# Patient Record
Sex: MALE | Race: WHITE | NOT HISPANIC OR LATINO | Employment: FULL TIME | URBAN - METROPOLITAN AREA
[De-identification: names, ages, dates, MRNs, and addresses within clinical notes are randomized per-mention and may not be internally consistent; named-entity substitution may affect disease eponyms.]

---

## 2023-07-08 LAB
ALANINE AMINOTRANSFERASE (SGPT) (U/L) IN SER/PLAS: 19 U/L (ref 10–52)
ALBUMIN (G/DL) IN SER/PLAS: 4.4 G/DL (ref 3.4–5)
ALKALINE PHOSPHATASE (U/L) IN SER/PLAS: 68 U/L (ref 33–136)
ANION GAP IN SER/PLAS: 12 MMOL/L (ref 10–20)
ASPARTATE AMINOTRANSFERASE (SGOT) (U/L) IN SER/PLAS: 17 U/L (ref 9–39)
BILIRUBIN TOTAL (MG/DL) IN SER/PLAS: 1.1 MG/DL (ref 0–1.2)
CALCIUM (MG/DL) IN SER/PLAS: 9.1 MG/DL (ref 8.6–10.3)
CARBON DIOXIDE, TOTAL (MMOL/L) IN SER/PLAS: 25 MMOL/L (ref 21–32)
CHLORIDE (MMOL/L) IN SER/PLAS: 106 MMOL/L (ref 98–107)
CHOLESTEROL (MG/DL) IN SER/PLAS: 148 MG/DL (ref 0–199)
CHOLESTEROL IN HDL (MG/DL) IN SER/PLAS: 44.7 MG/DL
CHOLESTEROL/HDL RATIO: 3.3
CREATININE (MG/DL) IN SER/PLAS: 0.96 MG/DL (ref 0.5–1.3)
ERYTHROCYTE DISTRIBUTION WIDTH (RATIO) BY AUTOMATED COUNT: 13 % (ref 11.5–14.5)
ERYTHROCYTE MEAN CORPUSCULAR HEMOGLOBIN CONCENTRATION (G/DL) BY AUTOMATED: 32.8 G/DL (ref 32–36)
ERYTHROCYTE MEAN CORPUSCULAR VOLUME (FL) BY AUTOMATED COUNT: 89 FL (ref 80–100)
ERYTHROCYTES (10*6/UL) IN BLOOD BY AUTOMATED COUNT: 5.47 X10E12/L (ref 4.5–5.9)
GFR MALE: 90 ML/MIN/1.73M2
GLUCOSE (MG/DL) IN SER/PLAS: 107 MG/DL (ref 74–99)
HEMATOCRIT (%) IN BLOOD BY AUTOMATED COUNT: 48.8 % (ref 41–52)
HEMOGLOBIN (G/DL) IN BLOOD: 16 G/DL (ref 13.5–17.5)
LDL: 88 MG/DL (ref 0–99)
LEUKOCYTES (10*3/UL) IN BLOOD BY AUTOMATED COUNT: 8.4 X10E9/L (ref 4.4–11.3)
NRBC (PER 100 WBCS) BY AUTOMATED COUNT: 0 /100 WBC (ref 0–0)
PLATELETS (10*3/UL) IN BLOOD AUTOMATED COUNT: 243 X10E9/L (ref 150–450)
POTASSIUM (MMOL/L) IN SER/PLAS: 4.4 MMOL/L (ref 3.5–5.3)
PROSTATE SPECIFIC ANTIGEN,SCREEN: 2.36 NG/ML (ref 0–4)
PROTEIN TOTAL: 7 G/DL (ref 6.4–8.2)
SODIUM (MMOL/L) IN SER/PLAS: 139 MMOL/L (ref 136–145)
TRIGLYCERIDE (MG/DL) IN SER/PLAS: 79 MG/DL (ref 0–149)
UREA NITROGEN (MG/DL) IN SER/PLAS: 21 MG/DL (ref 6–23)
VLDL: 16 MG/DL (ref 0–40)

## 2023-07-09 LAB
ESTIMATED AVERAGE GLUCOSE FOR HBA1C: 128 MG/DL
HEMOGLOBIN A1C/HEMOGLOBIN TOTAL IN BLOOD: 6.1 %

## 2023-10-19 PROBLEM — J45.909 ASTHMA (HHS-HCC): Status: ACTIVE | Noted: 2023-10-19

## 2023-10-19 PROBLEM — K64.4 EXTERNAL HEMORRHOIDS: Status: ACTIVE | Noted: 2023-10-19

## 2023-10-19 PROBLEM — G45.9 TIA (TRANSIENT ISCHEMIC ATTACK): Status: ACTIVE | Noted: 2023-10-19

## 2023-10-19 PROBLEM — I48.0 PAROXYSMAL ATRIAL FIBRILLATION (MULTI): Status: ACTIVE | Noted: 2023-10-19

## 2023-10-19 PROBLEM — R06.00 DYSPNEA: Status: ACTIVE | Noted: 2023-10-19

## 2023-10-19 PROBLEM — G47.00 INSOMNIA: Status: ACTIVE | Noted: 2023-10-19

## 2023-10-19 PROBLEM — M25.522 LEFT ELBOW PAIN: Status: ACTIVE | Noted: 2023-10-19

## 2023-10-19 PROBLEM — R73.9 HYPERGLYCEMIA: Status: ACTIVE | Noted: 2023-10-19

## 2023-10-19 PROBLEM — I10 HYPERTENSION: Status: ACTIVE | Noted: 2023-10-19

## 2023-10-19 PROBLEM — D22.9 ATYPICAL NEVI: Status: ACTIVE | Noted: 2023-10-19

## 2023-10-19 PROBLEM — M51.26 HERNIATED LUMBAR INTERVERTEBRAL DISC: Status: ACTIVE | Noted: 2023-10-19

## 2023-10-19 RX ORDER — ALBUTEROL SULFATE 90 UG/1
AEROSOL, METERED RESPIRATORY (INHALATION)
COMMUNITY
Start: 2022-09-28 | End: 2023-11-21 | Stop reason: SDUPTHER

## 2023-10-19 RX ORDER — VALSARTAN 80 MG/1
1 TABLET ORAL DAILY
COMMUNITY
Start: 2021-04-05 | End: 2023-11-13

## 2023-10-19 RX ORDER — FLUTICASONE PROPIONATE AND SALMETEROL 250; 50 UG/1; UG/1
1 POWDER RESPIRATORY (INHALATION)
COMMUNITY
End: 2024-01-08 | Stop reason: WASHOUT

## 2023-10-19 RX ORDER — IBUPROFEN 200 MG
1 TABLET ORAL 3 TIMES DAILY PRN
COMMUNITY
Start: 2019-04-22

## 2023-10-19 RX ORDER — FLUTICASONE FUROATE AND VILANTEROL 100; 25 UG/1; UG/1
1 POWDER RESPIRATORY (INHALATION) DAILY
COMMUNITY

## 2023-10-19 RX ORDER — ATORVASTATIN CALCIUM 20 MG/1
1 TABLET, FILM COATED ORAL DAILY
COMMUNITY
Start: 2019-06-19

## 2023-10-19 RX ORDER — METOPROLOL TARTRATE 100 MG/1
1 TABLET ORAL 2 TIMES DAILY
COMMUNITY
Start: 2019-06-19 | End: 2024-01-08 | Stop reason: DRUGHIGH

## 2023-10-19 RX ORDER — CYCLOBENZAPRINE HCL 10 MG
TABLET ORAL
COMMUNITY
Start: 2022-12-31 | End: 2023-10-31 | Stop reason: SDUPTHER

## 2023-10-19 RX ORDER — ASPIRIN 81 MG/1
1 TABLET ORAL DAILY
COMMUNITY
Start: 2020-05-08 | End: 2023-12-27 | Stop reason: ALTCHOICE

## 2023-10-20 ENCOUNTER — OFFICE VISIT (OUTPATIENT)
Dept: CARDIOLOGY | Facility: CLINIC | Age: 63
End: 2023-10-20
Payer: COMMERCIAL

## 2023-10-20 VITALS
BODY MASS INDEX: 29.49 KG/M2 | WEIGHT: 206 LBS | DIASTOLIC BLOOD PRESSURE: 60 MMHG | SYSTOLIC BLOOD PRESSURE: 104 MMHG | HEART RATE: 91 BPM | HEIGHT: 70 IN

## 2023-10-20 DIAGNOSIS — I48.0 PAROXYSMAL ATRIAL FIBRILLATION (MULTI): ICD-10-CM

## 2023-10-20 DIAGNOSIS — I48.3 TYPICAL ATRIAL FLUTTER (MULTI): Primary | ICD-10-CM

## 2023-10-20 PROCEDURE — 3074F SYST BP LT 130 MM HG: CPT | Performed by: INTERNAL MEDICINE

## 2023-10-20 PROCEDURE — 99204 OFFICE O/P NEW MOD 45 MIN: CPT | Performed by: INTERNAL MEDICINE

## 2023-10-20 PROCEDURE — 3078F DIAST BP <80 MM HG: CPT | Performed by: INTERNAL MEDICINE

## 2023-10-20 PROCEDURE — 93000 ELECTROCARDIOGRAM COMPLETE: CPT | Performed by: INTERNAL MEDICINE

## 2023-10-20 NOTE — PROGRESS NOTES
Reason For Consult    Atrial flutter    History Of Present Illness    This is a 63-year-old male with a history of atrial flutter.  He is being seen today in consultation at the request of Dr. Salas Starks..  The patient had a YESI guided cardioversion for atrial flutter in June 2019 which was successful.  He has been off anticoagulation.  Recently he began to notice some fatigue and dyspnea on exertion.  He has no complaints of chest pain, dizziness, but did have a vasovagal syncopal episode earlier this year.  He reports being dehydrated and had a brief loss of consciousness when he was working outside on a hot day.  No other syncopal episodes reported.  Available outpatient cardiology records reviewed.     Review of systems  Other review of systems negative other than as outlined in HPI     Social History  He has no history on file for tobacco use, alcohol use, and drug use.    Family History  Family History   Problem Relation Name Age of Onset    Esophageal cancer Mother      Diabetes Father      Coronary artery disease Maternal Grandfather          Allergies  Patient has no known allergies.    Medications  Current Outpatient Medications   Medication Instructions    albuterol 90 mcg/actuation inhaler INHALE 1 TO 2 PUFFS EVERY 4 TO 6 HOURS AS NEEDED.    aspirin 81 mg EC tablet 1 tablet, oral, Daily    atorvastatin (Lipitor) 20 mg tablet 1 tablet, oral, Daily    cyclobenzaprine (Flexeril) 10 mg tablet oral    fluticasone furoate-vilanteroL (Breo Ellipta) 100-25 mcg/dose inhaler 1 puff, inhalation, Daily    fluticasone propion-salmeteroL (Advair Diskus) 250-50 mcg/dose diskus inhaler 1 puff, inhalation, 2 times daily RT, Rinse mouth with water after use to reduce aftertaste and incidence of candidiasis. Do not swallow.    ibuprofen (AdviL) 200 mg tablet 1 tablet, oral, 3 times daily PRN    metoprolol tartrate (Lopressor) 100 mg tablet 1 tablet, oral, 2 times daily    rivaroxaban (XARELTO) 20 mg, oral, Daily with  "evening meal, Take with food.    valsartan (Diovan) 80 mg tablet 1 tablet, oral, Daily         Vitals      3/29/2022     8:18 AM 5/13/2022     7:59 AM 6/7/2022     3:44 PM 9/28/2022     4:44 PM 3/29/2023     4:36 PM 7/10/2023     5:19 PM 10/20/2023     8:12 AM   Vitals   Systolic 121 133 108 120 130 122 104   Diastolic 83 84 83 78 88 88 60   Heart Rate 139 64 82 108 106 111 91   Temp 35.7 °C (96.3 °F) 35.8 °C (96.4 °F) 37.1 °C (98.7 °F) 36.2 °C (97.1 °F) 35.9 °C (96.6 °F) 36.1 °C (96.9 °F)    Resp  17        Height (in) 1.778 m (5' 10\") 1.778 m (5' 10\") 1.778 m (5' 10\") 1.778 m (5' 10\") 1.778 m (5' 10\") 1.778 m (5' 10\") 1.778 m (5' 10\")   Weight (lb) 201.37 202.13 201.25 199.13 203.38 207.38 206   BMI 28.89 kg/m2 29 kg/m2 28.88 kg/m2 28.57 kg/m2 29.18 kg/m2 29.76 kg/m2 29.56 kg/m2   BSA (m2) 2.12 m2 2.13 m2 2.12 m2 2.11 m2 2.14 m2 2.16 m2 2.15 m2   Visit Report       Report        Physical Exam    General Appearance:  Alert, oriented, no distress  Skin:  Warm and dry  Head and Neck:  No elevation of JVP, no carotid bruits  Cardiac Exam:  Rhythm is regular, S1 and S2 are normal, no murmur S3 or S4  Lungs:  Clear to auscultation  Extremities:  no edema  Neurologic:  No focal deficits  Psychiatric:  Appropriate mood and behavior       Test Results    EKG October 20, 2023: Atrial flutter, no ischemic changes  EKG July 3, 2019: Sinus rhythm  Transesophageal echocardiogram June 2019: Ejection fraction 50%     Assessment/Plan  Problem List Items Addressed This Visit             ICD-10-CM    Paroxysmal atrial fibrillation (CMS/HCC) I48.0    Relevant Medications    rivaroxaban (Xarelto) 20 mg tablet    Other Relevant Orders    ECG 12 lead (Clinic Performed)    Typical atrial flutter (CMS/HCC) - Primary I48.3     This patient has a history of typical atrial flutter and had a YESI guided cardioversion in June 2019.  He did well until the past few months when he began to notice some fatigue and dyspnea on exertion.  He is back " in atrial flutter.  I recommended a cardioversion to restore sinus rhythm.  He will be placed on Xarelto 20 mg daily for anticoagulation, and aspirin will be discontinued.  I also discussed the option of catheter ablation for more definitive treatment of atrial flutter, but the patient would prefer noninvasive approach at this time and then consider ablation if the arrhythmia recurs again after the cardioversion.  The cardioversion procedure, risks, alternatives were discussed and this will be scheduled at [facility] in 3 to 4 weeks.                  James C Ramicone, DO

## 2023-10-20 NOTE — ASSESSMENT & PLAN NOTE
This patient has a history of typical atrial flutter and had a YESI guided cardioversion in June 2019.  He did well until the past few months when he began to notice some fatigue and dyspnea on exertion.  He is back in atrial flutter.  I recommended a cardioversion to restore sinus rhythm.  He will be placed on Xarelto 20 mg daily for anticoagulation, and aspirin will be discontinued.  I also discussed the option of catheter ablation for more definitive treatment of atrial flutter, but the patient would prefer noninvasive approach at this time and then consider ablation if the arrhythmia recurs again after the cardioversion.  The cardioversion procedure, risks, alternatives were discussed and this will be scheduled at [facility] in 3 to 4 weeks.

## 2023-10-31 ENCOUNTER — TELEPHONE (OUTPATIENT)
Dept: PRIMARY CARE | Facility: CLINIC | Age: 63
End: 2023-10-31
Payer: COMMERCIAL

## 2023-10-31 DIAGNOSIS — M51.26 HERNIATED LUMBAR INTERVERTEBRAL DISC: Primary | ICD-10-CM

## 2023-10-31 RX ORDER — CYCLOBENZAPRINE HCL 10 MG
10 TABLET ORAL 3 TIMES DAILY PRN
Qty: 30 TABLET | Refills: 1 | Status: SHIPPED | OUTPATIENT
Start: 2023-10-31 | End: 2023-12-29 | Stop reason: SDUPTHER

## 2023-10-31 NOTE — TELEPHONE ENCOUNTER
Rx Refill Request Telephone Encounter    Name:  Clyde Pacheco  :  [id number]  Medication Name:  cyclobenzaprine  Dose : 10 mg  Directions : take by mouth  Specific Pharmacy location:  [facility] on file  Date of next appointment:  2024  Best number to reach patient:  [phone]

## 2023-11-01 ENCOUNTER — PATIENT MESSAGE (OUTPATIENT)
Dept: PRIMARY CARE | Facility: CLINIC | Age: 63
End: 2023-11-01
Payer: COMMERCIAL

## 2023-11-01 DIAGNOSIS — J06.9 UPPER RESPIRATORY TRACT INFECTION, UNSPECIFIED TYPE: Primary | ICD-10-CM

## 2023-11-01 DIAGNOSIS — I48.0 PAROXYSMAL ATRIAL FIBRILLATION (MULTI): ICD-10-CM

## 2023-11-01 RX ORDER — AZITHROMYCIN 250 MG/1
TABLET, FILM COATED ORAL
Qty: 6 TABLET | Refills: 0 | Status: SHIPPED | OUTPATIENT
Start: 2023-11-01 | End: 2023-11-06

## 2023-11-10 DIAGNOSIS — I10 PRIMARY HYPERTENSION: Primary | ICD-10-CM

## 2023-11-13 RX ORDER — VALSARTAN 80 MG/1
80 TABLET ORAL DAILY
Qty: 90 TABLET | Refills: 3 | Status: SHIPPED | OUTPATIENT
Start: 2023-11-13

## 2023-11-14 DIAGNOSIS — I48.0 PAROXYSMAL ATRIAL FIBRILLATION (MULTI): Primary | ICD-10-CM

## 2023-11-21 ENCOUNTER — ANESTHESIA (OUTPATIENT)
Dept: CARDIOLOGY | Facility: HOSPITAL | Age: 63
End: 2023-11-21
Payer: COMMERCIAL

## 2023-11-21 ENCOUNTER — ANESTHESIA EVENT (OUTPATIENT)
Dept: CARDIOLOGY | Facility: HOSPITAL | Age: 63
End: 2023-11-21
Payer: COMMERCIAL

## 2023-11-21 ENCOUNTER — HOSPITAL ENCOUNTER (OUTPATIENT)
Facility: HOSPITAL | Age: 63
Setting detail: OUTPATIENT SURGERY
Discharge: HOME | End: 2023-11-21
Attending: INTERNAL MEDICINE | Admitting: INTERNAL MEDICINE
Payer: COMMERCIAL

## 2023-11-21 ENCOUNTER — PATIENT MESSAGE (OUTPATIENT)
Dept: PRIMARY CARE | Facility: CLINIC | Age: 63
End: 2023-11-21

## 2023-11-21 VITALS
SYSTOLIC BLOOD PRESSURE: 118 MMHG | HEIGHT: 70 IN | BODY MASS INDEX: 29.29 KG/M2 | OXYGEN SATURATION: 97 % | HEART RATE: 137 BPM | DIASTOLIC BLOOD PRESSURE: 87 MMHG | RESPIRATION RATE: 16 BRPM | WEIGHT: 204.59 LBS

## 2023-11-21 DIAGNOSIS — I48.0 PAROXYSMAL ATRIAL FIBRILLATION (MULTI): ICD-10-CM

## 2023-11-21 DIAGNOSIS — R06.00 DYSPNEA, UNSPECIFIED TYPE: Primary | ICD-10-CM

## 2023-11-21 PROCEDURE — 93010 ELECTROCARDIOGRAM REPORT: CPT | Performed by: INTERNAL MEDICINE

## 2023-11-21 PROCEDURE — 3700000001 HC GENERAL ANESTHESIA TIME - INITIAL BASE CHARGE: Performed by: INTERNAL MEDICINE

## 2023-11-21 PROCEDURE — 94760 N-INVAS EAR/PLS OXIMETRY 1: CPT

## 2023-11-21 PROCEDURE — 7100000009 HC PHASE TWO TIME - INITIAL BASE CHARGE: Performed by: INTERNAL MEDICINE

## 2023-11-21 PROCEDURE — 92960 CARDIOVERSION ELECTRIC EXT: CPT | Mod: 59 | Performed by: INTERNAL MEDICINE

## 2023-11-21 PROCEDURE — 2500000004 HC RX 250 GENERAL PHARMACY W/ HCPCS (ALT 636 FOR OP/ED): Performed by: ANESTHESIOLOGIST ASSISTANT

## 2023-11-21 PROCEDURE — A92960 PR CARDIOVERSION, ELECTIVE;EXTERN: Performed by: ANESTHESIOLOGY

## 2023-11-21 PROCEDURE — 3700000002 HC GENERAL ANESTHESIA TIME - EACH INCREMENTAL 1 MINUTE: Performed by: INTERNAL MEDICINE

## 2023-11-21 PROCEDURE — 7100000010 HC PHASE TWO TIME - EACH INCREMENTAL 1 MINUTE: Performed by: INTERNAL MEDICINE

## 2023-11-21 PROCEDURE — A92960 PR CARDIOVERSION, ELECTIVE;EXTERN: Performed by: ANESTHESIOLOGIST ASSISTANT

## 2023-11-21 RX ORDER — PROPOFOL 10 MG/ML
INJECTION, EMULSION INTRAVENOUS AS NEEDED
Status: DISCONTINUED | OUTPATIENT
Start: 2023-11-21 | End: 2023-11-21

## 2023-11-21 RX ORDER — ALBUTEROL SULFATE 90 UG/1
AEROSOL, METERED RESPIRATORY (INHALATION)
Qty: 18 G | Refills: 1 | Status: SHIPPED | OUTPATIENT
Start: 2023-11-21 | End: 2024-01-16 | Stop reason: SDUPTHER

## 2023-11-21 RX ADMIN — PROPOFOL 70 MG: 10 INJECTION, EMULSION INTRAVENOUS at 07:40

## 2023-11-21 SDOH — HEALTH STABILITY: MENTAL HEALTH: CURRENT SMOKER: 0

## 2023-11-21 ASSESSMENT — COLUMBIA-SUICIDE SEVERITY RATING SCALE - C-SSRS
1. IN THE PAST MONTH, HAVE YOU WISHED YOU WERE DEAD OR WISHED YOU COULD GO TO SLEEP AND NOT WAKE UP?: NO
2. HAVE YOU ACTUALLY HAD ANY THOUGHTS OF KILLING YOURSELF?: NO
6. HAVE YOU EVER DONE ANYTHING, STARTED TO DO ANYTHING, OR PREPARED TO DO ANYTHING TO END YOUR LIFE?: NO

## 2023-11-21 ASSESSMENT — ENCOUNTER SYMPTOMS
FEVER: 0
SHORTNESS OF BREATH: 0
PALPITATIONS: 0
CHILLS: 0
WHEEZING: 0

## 2023-11-21 ASSESSMENT — PAIN SCALES - GENERAL
PAINLEVEL_OUTOF10: 0 - NO PAIN
PAINLEVEL_OUTOF10: 0 - NO PAIN

## 2023-11-21 ASSESSMENT — PAIN - FUNCTIONAL ASSESSMENT
PAIN_FUNCTIONAL_ASSESSMENT: 0-10
PAIN_FUNCTIONAL_ASSESSMENT: 0-10

## 2023-11-21 NOTE — ANESTHESIA POSTPROCEDURE EVALUATION
Patient: Clyde Pacheco    Procedure Summary       Date: 11/21/23 Room / Location: [facility] CATH LAB 2 / Virtual [facility] Cardiac Cath Lab    Anesthesia Start: 0738 Anesthesia Stop: 0745    Procedure: Cardioversion CPT 83818 Diagnosis:       Paroxysmal atrial fibrillation (CMS/HCC)      (Paroxysmal atrial fibrillation (CMS/HCC) [I48.0])    Providers: James C Ramicone, DO Responsible Provider: Alexis Benjamin MD    Anesthesia Type: MAC ASA Status: 3            Anesthesia Type: MAC    Vitals Value Taken Time   /80 11/21/23 0745   Temp 36.2 11/21/23 0745   Pulse 76 11/21/23 0745   Resp 14 11/21/23 0745   SpO2 98 11/21/23 0745       Anesthesia Post Evaluation    Patient participation: complete - patient participated  Level of consciousness: awake  Pain management: adequate  Airway patency: patent  Cardiovascular status: acceptable  Respiratory status: acceptable  Hydration status: acceptable  Postoperative Nausea and Vomiting: none        There were no known notable events for this encounter.

## 2023-11-21 NOTE — H&P
History Of Present Illness  Clyde Pacheco is a 63 y.o. male presenting with  atrial flutter.    This is a 63-year-old male with atrial flutter.  The patient has been experiencing fatigue and shortness of breath while in atrial flutter.  He has a history of a YESI guided cardioversion back in June 2019 which was successful.  However he is back in atrial flutter and has been symptomatic.     Past Medical History  Past Medical History:   Diagnosis Date    Acute upper respiratory infection, unspecified 12/11/2020    Viral URI with cough    Arrhythmia     Contact with and (suspected) exposure to covid-19 12/11/2020    Suspected COVID-19 virus infection    Encounter for follow-up examination after completed treatment for conditions other than malignant neoplasm 05/13/2022    Postop check    Encounter for removal of sutures 05/13/2022    Visit for suture removal    Encounter for screening for cardiovascular disorders 09/23/2019    Screening for cardiovascular condition    Encounter for screening for malignant neoplasm of colon 09/23/2019    Screening for colon cancer    Hyperlipidemia     Localized swelling, mass and lump, left upper limb 03/12/2022    Mass of left axilla    Localized swelling, mass and lump, neck 02/11/2022    Lump in neck    Other diseases of salivary glands 03/16/2022    Parotid mass    Personal history of other (healed) physical injury and trauma 12/08/2015    History of strain    Personal history of other diseases of the circulatory system     History of irregular heartbeat    Personal history of other diseases of the nervous system and sense organs 11/22/2016    History of bacterial conjunctivitis    Personal history of other diseases of the respiratory system 11/22/2016    History of acute sinusitis    Proteinuria, unspecified 04/21/2021    Proteinuria       Surgical History  Past Surgical History:   Procedure Laterality Date    MR HEAD ANGIO WO IV CONTRAST  6/17/2019    MR HEAD ANGIO WO IV  "CONTRAST 6/17/2019 [facility]    MR NECK ANGIO WO IV CONTRAST  6/17/2019    MR NECK ANGIO WO IV CONTRAST 6/17/2019 [facility] CLINICAL LEGAC[facility]        Social History  He reports that he has quit smoking. His smoking use included cigarettes. He has never used smokeless tobacco. He reports that he does not drink alcohol and does not use drugs.    Family History  Family History   Problem Relation Name Age of Onset    Esophageal cancer Mother      Diabetes Father      Coronary artery disease Maternal Grandfather          Allergies  Patient has no known allergies.    Review of Systems   Constitutional:  Negative for chills and fever.   HENT:  Negative for nosebleeds.    Eyes:  Negative for visual disturbance.   Respiratory:  Negative for shortness of breath and wheezing.    Cardiovascular:  Negative for chest pain, palpitations and leg swelling.   Endocrine: Negative for cold intolerance and heat intolerance.   Skin:  Negative for rash.   Neurological:  Negative for syncope.   All other systems reviewed and are negative.       Physical Exam  Constitutional:       General: He is not in acute distress.  HENT:      Mouth/Throat:      Mouth: Mucous membranes are moist.   Neck:      Vascular: No carotid bruit.   Cardiovascular:      Rate and Rhythm: Normal rate and regular rhythm.      Heart sounds: No murmur heard.  Pulmonary:      Effort: Pulmonary effort is normal.      Breath sounds: Normal breath sounds.   Abdominal:      Palpations: Abdomen is soft.      Tenderness: There is no abdominal tenderness.   Musculoskeletal:         General: No swelling.   Skin:     General: Skin is warm and dry.      Findings: No rash.   Neurological:      General: No focal deficit present.      Mental Status: He is alert.          Last Recorded Vitals  Blood pressure 118/87, pulse (!) 137, resp. rate 16, height 1.778 m (5' 10\"), weight 92.8 kg (204 lb 9.4 oz), SpO2 97 %.    Relevant Results             Assessment/Plan   Principal " Problem:    Paroxysmal atrial fibrillation (CMS/HCC)      1.  Atrial flutter: This patient is in atrial flutter with 2 1 AV conduction.  He has been appropriately anticoagulated with Xarelto and presents to the EP laboratory today for a cardioversion.  The procedure, risks, alternatives were discussed.       I spent minutes in the professional and overall care of this patient.      James C Ramicone, DO

## 2023-11-21 NOTE — ANESTHESIA PREPROCEDURE EVALUATION
Patient: Clyde Pacheco    Procedure Information       Date/Time: 11/21/23 0730    Procedure: Cardioversion CPT 53717 - AOK    Location: [facility] EP LAB / Virtual [facility] Cardiac Cath Lab    Providers: James C Ramicone, DO            Relevant Problems   Cardiovascular   (+) Hypertension   (+) Paroxysmal atrial fibrillation (CMS/HCC)   (+) Typical atrial flutter (CMS/HCC)      Pulmonary   (+) Asthma      Musculoskeletal   (+) Herniated lumbar intervertebral disc       Clinical information reviewed:   Tobacco  Allergies  Meds  Problems  Med Hx  Surg Hx   Fam Hx  Soc   Hx        NPO Detail:  No data recorded     Physical Exam    Airway  Mallampati: II  TM distance: >3 FB  Neck ROM: full     Cardiovascular - normal exam  Rhythm: regular  Rate: normal     Dental - normal exam     Pulmonary - normal exam     Abdominal            Anesthesia Plan    ASA 3     MAC     The patient is not a current smoker.    intravenous induction   Anesthetic plan and risks discussed with patient.  Use of blood products discussed with patient who consented to blood products.    Plan discussed with CRNA.

## 2023-11-21 NOTE — DISCHARGE INSTRUCTIONS
Decrease metoprolol to 100 mg in the morning and 50 mg in the evening    Follow-up with Dr. Ramicone December 27, 2023 at 10:15 AM, [email or web address] [place] [address]    Notify provider If you have any questions about the effects of the sedative drugs.  Notify provider or go to nearest emergency room If you develop difficulty breathing, rash, hives, severe nausea, vomiting, light-headedness, or any signs of infection.      Activity instructions  DO NOT drink any alcholic beverages or take any non-prescriptive medications that contain alcohol for the first 24 hours  DO NOT drive a car, operate machinery or power tools. It is recommended that a responsible adult be with you for the first 24 hours.  DO NOT make important decisions for the first 24 hours         FOR SUDDEN AND SEVERE CHEST PAIN, SHORTNESS OF BREATH, EXCESSIVE BLEEDING, SIGNS IF STROKE, OR CHANGES IN MENTAL STATUS PLEASE CALL 911 IMMEDIATELY.    -Return home and rest for the rest of the day, it is recommended that a responsible adult is with you for the next 24 hours.   -No driving for 24 hours.  -No non prescriptive medications or alcohol for 24 hours.  -Do not operate machinery, or use power tools for 24 hours.   -Do not make any important decisions or sign any important documents for the next 24 hours.   -Shower tomorrow   -Avoid sexual activity for 24 hours.     Site care-  - Do not bend your wrist for 24 hours after your procedure, You can take the arm board 24 hours after your procedure. No lifting greater than 5 pounds for 5 days. Treat your wrist as if it is sprained.  - Do not submerge the wrist for seven days in water. Getting it wet is ok, do not keep it under water.  -Remove transparent dressing tomorrow, you can cleanse the site gently with soap and water. Apply a clean dry Band-Aid every day until healed. Avoid ointments or powders until fully healed.   -If you start to bleed have someone hold firm pressure above the site for 5  minutes, if you are still bleeding after holding pressure please call 911, do NOT drive yourself to the hospital.   -If you develop a large bruise that is hard to the touch and growing that is a hematoma and a medical emergency, please go to the ER.   -It is normal to have a small bruise at the insertion site  -Mild tingling in the hand is normal, if you lose any feeling in the fingers or tingling last more than 3 days please call your doctor or go to the ER.   - You can take a tylenol for discomfort, if pain is severe and not relieved by tylenol please call your doctor.    OTHER INSTRUCTIONS-  If you do not have a follow up appointment with your cardiologist in the next 2-4 weeks please call to make an appointment.

## 2023-11-25 ENCOUNTER — HOSPITAL ENCOUNTER (OUTPATIENT)
Dept: CARDIOLOGY | Facility: HOSPITAL | Age: 63
Discharge: HOME | End: 2023-11-25
Payer: COMMERCIAL

## 2023-11-25 LAB
ATRIAL RATE: 274 BPM
P AXIS: 235 DEGREES
P OFFSET: 202 MS
P ONSET: 132 MS
Q ONSET: 214 MS
QRS COUNT: 22 BEATS
QRS DURATION: 106 MS
QT INTERVAL: 386 MS
QTC CALCULATION(BAZETT): 582 MS
QTC FREDERICIA: 508 MS
R AXIS: 251 DEGREES
T AXIS: 56 DEGREES
T OFFSET: 407 MS
VENTRICULAR RATE: 137 BPM

## 2023-11-25 PROCEDURE — 93005 ELECTROCARDIOGRAM TRACING: CPT

## 2023-12-19 PROBLEM — R55 NEAR SYNCOPE: Status: ACTIVE | Noted: 2020-01-11

## 2023-12-27 ENCOUNTER — OFFICE VISIT (OUTPATIENT)
Dept: CARDIOLOGY | Facility: CLINIC | Age: 63
End: 2023-12-27
Payer: COMMERCIAL

## 2023-12-27 VITALS
HEIGHT: 70 IN | BODY MASS INDEX: 29.63 KG/M2 | WEIGHT: 207 LBS | SYSTOLIC BLOOD PRESSURE: 140 MMHG | OXYGEN SATURATION: 95 % | DIASTOLIC BLOOD PRESSURE: 80 MMHG | HEART RATE: 65 BPM

## 2023-12-27 DIAGNOSIS — I48.3 TYPICAL ATRIAL FLUTTER (MULTI): ICD-10-CM

## 2023-12-27 DIAGNOSIS — I48.0 PAROXYSMAL ATRIAL FIBRILLATION (MULTI): Primary | ICD-10-CM

## 2023-12-27 PROCEDURE — 3079F DIAST BP 80-89 MM HG: CPT | Performed by: INTERNAL MEDICINE

## 2023-12-27 PROCEDURE — 93000 ELECTROCARDIOGRAM COMPLETE: CPT | Performed by: INTERNAL MEDICINE

## 2023-12-27 PROCEDURE — 3077F SYST BP >= 140 MM HG: CPT | Performed by: INTERNAL MEDICINE

## 2023-12-27 PROCEDURE — 1036F TOBACCO NON-USER: CPT | Performed by: INTERNAL MEDICINE

## 2023-12-27 PROCEDURE — 99214 OFFICE O/P EST MOD 30 MIN: CPT | Performed by: INTERNAL MEDICINE

## 2023-12-27 NOTE — PATIENT INSTRUCTIONS
Stop Xarelto.    Resume Aspirin 81 mg daily.    Resume Xarelto 20 mg daily if the atrial flutter recurs and call Dr Ramicone.    Follow up with Dr Ramicone in 8-10 months.

## 2023-12-28 ENCOUNTER — PATIENT MESSAGE (OUTPATIENT)
Dept: PRIMARY CARE | Facility: CLINIC | Age: 63
End: 2023-12-28
Payer: COMMERCIAL

## 2023-12-28 DIAGNOSIS — M51.26 HERNIATED LUMBAR INTERVERTEBRAL DISC: ICD-10-CM

## 2023-12-28 NOTE — ASSESSMENT & PLAN NOTE
1.  Atrial flutter: The patient has a history of typical atrial flutter and had a YESI guided cardioversion in June 2019.  The atrial flutter recurred and he was cardioverted November 21, 2023.  Xarelto will be discontinued at this time.  If he has a recurrence of atrial flutter I have recommended catheter ablation.  He was advised to resume the Xarelto if the atrial flutter recurs and then contact me to schedule an ablation procedure.  The ablation procedure, risks, alternatives were reviewed today.

## 2023-12-28 NOTE — PROGRESS NOTES
"History Of Present Illness:      This is a 63-year-old male with atrial for flutter.  He presents for a follow-up evaluation.  The patient had a YESI guided cardioversion for atrial flutter in June 2019, but had a recurrence of the arrhythmia earlier this year and was successfully cardioverted November 21, 2023.  The patient has felt well since then and has had no other episodes of atrial flutter.    Review of Systems  Other review of systems negative     Last Recorded Vitals:      6/7/2022     3:44 PM 9/28/2022     4:44 PM 3/29/2023     4:36 PM 7/10/2023     5:19 PM 10/20/2023     8:12 AM 11/21/2023     7:11 AM 12/27/2023    10:32 AM   Vitals   Systolic 108 120 130 122 104 118 140   Diastolic 83 78 88 88 60 87 80   Heart Rate 82 108 106 111 91 137 65   Temp 37.1 °C (98.7 °F) 36.2 °C (97.1 °F) 35.9 °C (96.6 °F) 36.1 °C (96.9 °F)      Resp      16    Height (in) 1.778 m (5' 10\") 1.778 m (5' 10\") 1.778 m (5' 10\") 1.778 m (5' 10\") 1.778 m (5' 10\") 1.778 m (5' 10\") 1.778 m (5' 10\")   Weight (lb) 201.25 199.13 203.38 207.38 206 204.59 207   BMI 28.88 kg/m2 28.57 kg/m2 29.18 kg/m2 29.76 kg/m2 29.56 kg/m2 29.36 kg/m2 29.7 kg/m2   BSA (m2) 2.12 m2 2.11 m2 2.14 m2 2.16 m2 2.15 m2 2.14 m2 2.15 m2   Visit Report     Report  Report          Allergies:  Patient has no known allergies.    Outpatient Medications:  Current Outpatient Medications   Medication Instructions    albuterol 90 mcg/actuation inhaler INHALE 1 TO 2 PUFFS EVERY 4 TO 6 HOURS AS NEEDED.    atorvastatin (Lipitor) 20 mg tablet 1 tablet, oral, Daily    cyclobenzaprine (FLEXERIL) 10 mg, oral, 3 times daily PRN    fluticasone furoate-vilanteroL (Breo Ellipta) 100-25 mcg/dose inhaler 1 puff, inhalation, Daily    fluticasone propion-salmeteroL (Advair Diskus) 250-50 mcg/dose diskus inhaler 1 puff, inhalation, 2 times daily RT, Rinse mouth with water after use to reduce aftertaste and incidence of candidiasis. Do not swallow.    ibuprofen (AdviL) 200 mg tablet 1 tablet, " oral, 3 times daily PRN    metoprolol tartrate (Lopressor) 100 mg tablet 1 tablet, oral, 2 times daily    rivaroxaban (XARELTO) 20 mg, oral, Daily with evening meal, Take with food.    valsartan (DIOVAN) 80 mg, oral, Daily       Physical Exam:    General Appearance:  Alert, oriented, no distress  Skin:  Warm and dry  Head and Neck:  No elevation of JVP, no carotid bruits  Cardiac Exam:  Rhythm is regular, S1 and S2 are normal, no murmur S3 or S4  Lungs:  Clear to auscultation  Extremities:  no edema  Neurologic:  No focal deficits  Psychiatric:  Appropriate mood and behavior         Cardiology Tests:  I have personally review the diagnostic cardiac testing and my interpretation is as follows:    EKG December 27, 2023: Sinus rhythm  Transesophageal echocardiogram June 2019: Ejection fraction 50%      Assessment/Plan   Problem List Items Addressed This Visit             ICD-10-CM    Paroxysmal atrial fibrillation (CMS/HCC) - Primary I48.0    Relevant Orders    ECG 12 lead (Clinic Performed) (Completed)    Typical atrial flutter (CMS/HCC) I48.3     1.  Atrial flutter: The patient has a history of typical atrial flutter and had a YESI guided cardioversion in June 2019.  The atrial flutter recurred and he was cardioverted November 21, 2023.  Xarelto will be discontinued at this time.  If he has a recurrence of atrial flutter I have recommended catheter ablation.  He was advised to resume the Xarelto if the atrial flutter recurs and then contact me to schedule an ablation procedure.  The ablation procedure, risks, alternatives were reviewed today.            James C Ramicone, DO

## 2023-12-29 RX ORDER — CYCLOBENZAPRINE HCL 10 MG
10 TABLET ORAL 3 TIMES DAILY PRN
Qty: 90 TABLET | Refills: 1 | Status: SHIPPED | OUTPATIENT
Start: 2023-12-29 | End: 2024-04-26

## 2024-01-08 ENCOUNTER — OFFICE VISIT (OUTPATIENT)
Dept: PRIMARY CARE | Facility: CLINIC | Age: 64
End: 2024-01-08
Payer: COMMERCIAL

## 2024-01-08 VITALS
HEIGHT: 70 IN | TEMPERATURE: 98 F | DIASTOLIC BLOOD PRESSURE: 69 MMHG | HEART RATE: 86 BPM | OXYGEN SATURATION: 91 % | BODY MASS INDEX: 30.46 KG/M2 | SYSTOLIC BLOOD PRESSURE: 105 MMHG | WEIGHT: 212.8 LBS

## 2024-01-08 DIAGNOSIS — J45.20 MILD INTERMITTENT ASTHMA WITHOUT COMPLICATION (HHS-HCC): ICD-10-CM

## 2024-01-08 DIAGNOSIS — I10 PRIMARY HYPERTENSION: ICD-10-CM

## 2024-01-08 DIAGNOSIS — I48.0 PAROXYSMAL ATRIAL FIBRILLATION (MULTI): Primary | ICD-10-CM

## 2024-01-08 PROBLEM — R06.00 DYSPNEA: Status: RESOLVED | Noted: 2023-10-19 | Resolved: 2024-01-08

## 2024-01-08 PROBLEM — R55 NEAR SYNCOPE: Status: RESOLVED | Noted: 2020-01-11 | Resolved: 2024-01-08

## 2024-01-08 PROCEDURE — 99214 OFFICE O/P EST MOD 30 MIN: CPT | Performed by: INTERNAL MEDICINE

## 2024-01-08 PROCEDURE — 3078F DIAST BP <80 MM HG: CPT | Performed by: INTERNAL MEDICINE

## 2024-01-08 PROCEDURE — 1036F TOBACCO NON-USER: CPT | Performed by: INTERNAL MEDICINE

## 2024-01-08 PROCEDURE — 3074F SYST BP LT 130 MM HG: CPT | Performed by: INTERNAL MEDICINE

## 2024-01-08 RX ORDER — METOPROLOL TARTRATE 100 MG/1
TABLET ORAL
Qty: 90 TABLET | Refills: 1 | Status: SHIPPED | OUTPATIENT
Start: 2024-01-08

## 2024-01-08 RX ORDER — ASPIRIN 81 MG/1
81 TABLET ORAL DAILY
COMMUNITY

## 2024-01-08 ASSESSMENT — COLUMBIA-SUICIDE SEVERITY RATING SCALE - C-SSRS
6. HAVE YOU EVER DONE ANYTHING, STARTED TO DO ANYTHING, OR PREPARED TO DO ANYTHING TO END YOUR LIFE?: NO
2. HAVE YOU ACTUALLY HAD ANY THOUGHTS OF KILLING YOURSELF?: NO
1. IN THE PAST MONTH, HAVE YOU WISHED YOU WERE DEAD OR WISHED YOU COULD GO TO SLEEP AND NOT WAKE UP?: NO

## 2024-01-08 ASSESSMENT — PATIENT HEALTH QUESTIONNAIRE - PHQ9
1. LITTLE INTEREST OR PLEASURE IN DOING THINGS: NOT AT ALL
SUM OF ALL RESPONSES TO PHQ9 QUESTIONS 1 & 2: 0
2. FEELING DOWN, DEPRESSED OR HOPELESS: NOT AT ALL

## 2024-01-08 ASSESSMENT — LIFESTYLE VARIABLES
SKIP TO QUESTIONS 9-10: 1
HOW MANY STANDARD DRINKS CONTAINING ALCOHOL DO YOU HAVE ON A TYPICAL DAY: PATIENT DOES NOT DRINK
AUDIT-C TOTAL SCORE: 0
HOW OFTEN DO YOU HAVE A DRINK CONTAINING ALCOHOL: NEVER
HOW OFTEN DO YOU HAVE SIX OR MORE DRINKS ON ONE OCCASION: NEVER

## 2024-01-08 NOTE — PROGRESS NOTES
"Subjective   Patient ID: Clyde Pacheco is a 63 y.o. male who presents for Follow-up.    HPI     Cardioverted in Nov. Has been in NSR. Xarelto d/c'd. Denies palpitations. Breathing and energy better.  Back on aspirin.    Asthma controlled with once daily breo. Rare albuterol use.    Not checking BP. No lightheadedness.    Review of Systems    Objective   /69   Pulse 86   Temp 36.7 °C (98 °F)   Ht 1.778 m (5' 10\")   Wt 96.5 kg (212 lb 12.8 oz)   SpO2 91%   BMI 30.53 kg/m²     Physical Exam  Constitutional:       Appearance: Normal appearance.   Cardiovascular:      Rate and Rhythm: Normal rate and regular rhythm.      Heart sounds: No murmur heard.  Pulmonary:      Effort: Pulmonary effort is normal.      Breath sounds: Normal breath sounds.   Musculoskeletal:      Right lower leg: No edema.      Left lower leg: No edema.   Neurological:      General: No focal deficit present.      Mental Status: He is alert.      Gait: Gait normal.         Assessment/Plan   Problem List Items Addressed This Visit             ICD-10-CM    Paroxysmal atrial fibrillation (CMS/HCC) - Primary I48.0    Relevant Medications    metoprolol tartrate (Lopressor) 100 mg tablet    Hypertension I10    Relevant Orders    CBC    Comprehensive Metabolic Panel    Prostate Specific Antigen, Screen    Lipid Panel    Hemoglobin A1C    Asthma J45.909          Afib - S/p cardioversion  -Continue metoprolol  -Continue aspirin  -Seeing Cardiology     Lumbar radiculopathy  -Stable, monitor     HTN  -Continue valsartan     HLP  -Continue statin     Asthma - Continue breo     Health maintenance:  -Colonoscopy - Cologuard neg 12/2022  -Prostate - PSA nml 7/2023  -Immunizations    -Shingrix - had 1st     f/u 6 months, labs ordered for prior  "

## 2024-01-16 ENCOUNTER — PATIENT MESSAGE (OUTPATIENT)
Dept: PRIMARY CARE | Facility: CLINIC | Age: 64
End: 2024-01-16
Payer: COMMERCIAL

## 2024-01-16 DIAGNOSIS — R06.00 DYSPNEA, UNSPECIFIED TYPE: ICD-10-CM

## 2024-01-16 RX ORDER — ALBUTEROL SULFATE 90 UG/1
AEROSOL, METERED RESPIRATORY (INHALATION)
Qty: 54 G | Refills: 1 | Status: SHIPPED | OUTPATIENT
Start: 2024-01-16

## 2024-04-26 DIAGNOSIS — M51.26 HERNIATED LUMBAR INTERVERTEBRAL DISC: ICD-10-CM

## 2024-04-26 RX ORDER — CYCLOBENZAPRINE HCL 10 MG
TABLET ORAL
Qty: 90 TABLET | Refills: 1 | Status: SHIPPED | OUTPATIENT
Start: 2024-04-26

## 2024-06-17 DIAGNOSIS — M51.26 HERNIATED LUMBAR INTERVERTEBRAL DISC: ICD-10-CM

## 2024-06-17 RX ORDER — CYCLOBENZAPRINE HCL 10 MG
TABLET ORAL
Qty: 90 TABLET | Refills: 1 | Status: SHIPPED | OUTPATIENT
Start: 2024-06-17

## 2024-06-21 ENCOUNTER — LAB (OUTPATIENT)
Dept: LAB | Facility: LAB | Age: 64
End: 2024-06-21
Payer: COMMERCIAL

## 2024-06-21 DIAGNOSIS — I10 PRIMARY HYPERTENSION: ICD-10-CM

## 2024-06-21 LAB
ALBUMIN SERPL BCP-MCNC: 4.5 G/DL (ref 3.4–5)
ALP SERPL-CCNC: 65 U/L (ref 33–136)
ALT SERPL W P-5'-P-CCNC: 17 U/L (ref 10–52)
ANION GAP SERPL CALC-SCNC: 12 MMOL/L (ref 10–20)
AST SERPL W P-5'-P-CCNC: 17 U/L (ref 9–39)
BILIRUB SERPL-MCNC: 1.2 MG/DL (ref 0–1.2)
BUN SERPL-MCNC: 17 MG/DL (ref 6–23)
CALCIUM SERPL-MCNC: 9.1 MG/DL (ref 8.6–10.3)
CHLORIDE SERPL-SCNC: 102 MMOL/L (ref 98–107)
CHOLEST SERPL-MCNC: 145 MG/DL (ref 0–199)
CHOLESTEROL/HDL RATIO: 3.2
CO2 SERPL-SCNC: 28 MMOL/L (ref 21–32)
CREAT SERPL-MCNC: 0.9 MG/DL (ref 0.5–1.3)
EGFRCR SERPLBLD CKD-EPI 2021: >90 ML/MIN/1.73M*2
ERYTHROCYTE [DISTWIDTH] IN BLOOD BY AUTOMATED COUNT: 12.8 % (ref 11.5–14.5)
GLUCOSE SERPL-MCNC: 87 MG/DL (ref 74–99)
HCT VFR BLD AUTO: 46.5 % (ref 41–52)
HDLC SERPL-MCNC: 45.1 MG/DL
HGB BLD-MCNC: 16 G/DL (ref 13.5–17.5)
LDLC SERPL CALC-MCNC: 78 MG/DL
MCH RBC QN AUTO: 30.5 PG (ref 26–34)
MCHC RBC AUTO-ENTMCNC: 34.4 G/DL (ref 32–36)
MCV RBC AUTO: 89 FL (ref 80–100)
NON HDL CHOLESTEROL: 100 MG/DL (ref 0–149)
NRBC BLD-RTO: 0 /100 WBCS (ref 0–0)
PLATELET # BLD AUTO: 277 X10*3/UL (ref 150–450)
POTASSIUM SERPL-SCNC: 4.5 MMOL/L (ref 3.5–5.3)
PROT SERPL-MCNC: 6.9 G/DL (ref 6.4–8.2)
PSA SERPL-MCNC: 2.56 NG/ML
RBC # BLD AUTO: 5.24 X10*6/UL (ref 4.5–5.9)
SODIUM SERPL-SCNC: 137 MMOL/L (ref 136–145)
TRIGL SERPL-MCNC: 108 MG/DL (ref 0–149)
VLDL: 22 MG/DL (ref 0–40)
WBC # BLD AUTO: 8.7 X10*3/UL (ref 4.4–11.3)

## 2024-06-21 PROCEDURE — 84153 ASSAY OF PSA TOTAL: CPT

## 2024-06-21 PROCEDURE — 85027 COMPLETE CBC AUTOMATED: CPT

## 2024-06-21 PROCEDURE — 36415 COLL VENOUS BLD VENIPUNCTURE: CPT

## 2024-06-21 PROCEDURE — 83036 HEMOGLOBIN GLYCOSYLATED A1C: CPT

## 2024-06-21 PROCEDURE — 80061 LIPID PANEL: CPT

## 2024-06-21 PROCEDURE — 80053 COMPREHEN METABOLIC PANEL: CPT

## 2024-06-22 LAB
EST. AVERAGE GLUCOSE BLD GHB EST-MCNC: 123 MG/DL
HBA1C MFR BLD: 5.9 %

## 2024-06-24 ENCOUNTER — TELEPHONE (OUTPATIENT)
Dept: PRIMARY CARE | Facility: CLINIC | Age: 64
End: 2024-06-24
Payer: COMMERCIAL

## 2024-06-24 NOTE — TELEPHONE ENCOUNTER
----- Message from Salas Starks MD sent at 6/23/2024  8:14 AM EDT -----  Labs all ok, continue same meds.

## 2024-07-08 ENCOUNTER — APPOINTMENT (OUTPATIENT)
Dept: PRIMARY CARE | Facility: CLINIC | Age: 64
End: 2024-07-08
Payer: COMMERCIAL

## 2024-07-08 VITALS
DIASTOLIC BLOOD PRESSURE: 81 MMHG | OXYGEN SATURATION: 92 % | SYSTOLIC BLOOD PRESSURE: 121 MMHG | HEIGHT: 70 IN | BODY MASS INDEX: 29.92 KG/M2 | HEART RATE: 78 BPM | WEIGHT: 209 LBS

## 2024-07-08 DIAGNOSIS — I10 PRIMARY HYPERTENSION: ICD-10-CM

## 2024-07-08 DIAGNOSIS — I48.0 PAROXYSMAL ATRIAL FIBRILLATION (MULTI): Primary | ICD-10-CM

## 2024-07-08 DIAGNOSIS — J45.20 MILD INTERMITTENT ASTHMA WITHOUT COMPLICATION (HHS-HCC): ICD-10-CM

## 2024-07-08 PROCEDURE — 99214 OFFICE O/P EST MOD 30 MIN: CPT | Performed by: INTERNAL MEDICINE

## 2024-07-08 PROCEDURE — 3079F DIAST BP 80-89 MM HG: CPT | Performed by: INTERNAL MEDICINE

## 2024-07-08 PROCEDURE — 1036F TOBACCO NON-USER: CPT | Performed by: INTERNAL MEDICINE

## 2024-07-08 PROCEDURE — 3074F SYST BP LT 130 MM HG: CPT | Performed by: INTERNAL MEDICINE

## 2024-07-08 RX ORDER — BUDESONIDE AND FORMOTEROL FUMARATE DIHYDRATE 160; 4.5 UG/1; UG/1
2 AEROSOL RESPIRATORY (INHALATION)
Qty: 10.2 G | Refills: 3 | Status: SHIPPED | OUTPATIENT
Start: 2024-07-08 | End: 2024-07-09 | Stop reason: SDUPTHER

## 2024-07-08 ASSESSMENT — PATIENT HEALTH QUESTIONNAIRE - PHQ9
SUM OF ALL RESPONSES TO PHQ9 QUESTIONS 1 & 2: 0
2. FEELING DOWN, DEPRESSED OR HOPELESS: NOT AT ALL
1. LITTLE INTEREST OR PLEASURE IN DOING THINGS: NOT AT ALL

## 2024-07-08 NOTE — PROGRESS NOTES
"Subjective   Patient ID: Clyde Pacheco is a 64 y.o. male who presents for Annual Exam.    HPI   Denies palpitations, dizziness. HR 70s at home.    BP controlled at home. No HA, dizziness, CP.    Feels breo doesn't seem to be getting into his lungs or helping at all.  Albuterol does feel like it opens his airways. Uses it when its hot out or short of breath and breathing improves.    Discussed recent labs. Lipids ok,. PSA normal. CMP ok.      Review of Systems    Objective   /81 (BP Location: Right arm, Patient Position: Sitting)   Pulse 78   Ht 1.778 m (5' 10\")   Wt 94.8 kg (209 lb)   SpO2 92%   BMI 29.99 kg/m²     Physical Exam  Constitutional:       Appearance: Normal appearance.   Cardiovascular:      Rate and Rhythm: Normal rate and regular rhythm.      Heart sounds: No murmur heard.  Pulmonary:      Effort: Pulmonary effort is normal.      Breath sounds: Normal breath sounds.   Musculoskeletal:      Right lower leg: No edema.      Left lower leg: No edema.   Neurological:      General: No focal deficit present.      Mental Status: He is alert.      Gait: Gait normal.         Assessment/Plan   Problem List Items Addressed This Visit             ICD-10-CM    Paroxysmal atrial fibrillation (Multi) - Primary I48.0    Hypertension I10    Asthma ([facility]) J45.909          Afib - S/p cardioversion  -Continue metoprolol  -Continue aspirin  -Seeing Cardiology     Lumbar radiculopathy  -Stable, monitor     HTN  -Continue valsartan     HLP  -Continue statin     Asthma - change breo to symbicort. Albuterol prn. Call if symbicort not helping minimize albuterol use.     Health maintenance:  -Colonoscopy - Cologuard neg 12/2022  -Prostate - PSA nml 6/2024  -Immunizations    -Shingrix - had 1st     f/u here in 1 yr, he will be following up with cardiology in 5-6 months  "

## 2024-07-09 DIAGNOSIS — J45.20 MILD INTERMITTENT ASTHMA WITHOUT COMPLICATION (HHS-HCC): ICD-10-CM

## 2024-07-09 RX ORDER — BUDESONIDE AND FORMOTEROL FUMARATE DIHYDRATE 160; 4.5 UG/1; UG/1
2 AEROSOL RESPIRATORY (INHALATION)
Qty: 10.2 G | Refills: 3 | Status: SHIPPED | OUTPATIENT
Start: 2024-07-09 | End: 2024-07-11 | Stop reason: SDUPTHER

## 2024-07-09 RX ORDER — BUDESONIDE AND FORMOTEROL FUMARATE DIHYDRATE 160; 4.5 UG/1; UG/1
2 AEROSOL RESPIRATORY (INHALATION)
Qty: 10.2 G | Refills: 3 | Status: SHIPPED | OUTPATIENT
Start: 2024-07-09 | End: 2024-07-09 | Stop reason: SDUPTHER

## 2024-07-11 DIAGNOSIS — J45.20 MILD INTERMITTENT ASTHMA WITHOUT COMPLICATION (HHS-HCC): ICD-10-CM

## 2024-07-11 RX ORDER — BUDESONIDE AND FORMOTEROL FUMARATE DIHYDRATE 160; 4.5 UG/1; UG/1
2 AEROSOL RESPIRATORY (INHALATION)
Qty: 10.2 G | Refills: 3 | Status: SHIPPED | OUTPATIENT
Start: 2024-07-11 | End: 2025-07-11

## 2024-07-15 DIAGNOSIS — J45.20 MILD INTERMITTENT ASTHMA WITHOUT COMPLICATION (HHS-HCC): ICD-10-CM

## 2024-07-15 RX ORDER — BUDESONIDE AND FORMOTEROL FUMARATE DIHYDRATE 160; 4.5 UG/1; UG/1
2 AEROSOL RESPIRATORY (INHALATION)
Qty: 10.2 G | Refills: 3 | Status: SHIPPED | OUTPATIENT
Start: 2024-07-15 | End: 2025-07-15

## 2024-08-13 ENCOUNTER — PATIENT MESSAGE (OUTPATIENT)
Dept: PRIMARY CARE | Facility: CLINIC | Age: 64
End: 2024-08-13
Payer: COMMERCIAL

## 2024-08-13 DIAGNOSIS — R06.00 DYSPNEA, UNSPECIFIED TYPE: ICD-10-CM

## 2024-08-13 RX ORDER — ALBUTEROL SULFATE 90 UG/1
INHALANT RESPIRATORY (INHALATION)
Qty: 54 G | Refills: 3 | Status: SHIPPED | OUTPATIENT
Start: 2024-08-13

## 2024-09-16 ENCOUNTER — PATIENT MESSAGE (OUTPATIENT)
Dept: PRIMARY CARE | Facility: CLINIC | Age: 64
End: 2024-09-16
Payer: COMMERCIAL

## 2024-09-16 DIAGNOSIS — M51.26 HERNIATED LUMBAR INTERVERTEBRAL DISC: ICD-10-CM

## 2024-09-16 DIAGNOSIS — I10 PRIMARY HYPERTENSION: Primary | ICD-10-CM

## 2024-09-16 RX ORDER — METOPROLOL TARTRATE 100 MG/1
100 TABLET ORAL 2 TIMES DAILY
Qty: 180 TABLET | Refills: 3 | Status: SHIPPED | OUTPATIENT
Start: 2024-09-16 | End: 2025-09-16

## 2024-09-16 RX ORDER — CYCLOBENZAPRINE HCL 10 MG
TABLET ORAL
Qty: 90 TABLET | Refills: 1 | Status: SHIPPED | OUTPATIENT
Start: 2024-09-16

## 2024-09-18 DIAGNOSIS — G45.9 TIA (TRANSIENT ISCHEMIC ATTACK): Primary | ICD-10-CM

## 2024-09-18 RX ORDER — ATORVASTATIN CALCIUM 20 MG/1
20 TABLET, FILM COATED ORAL DAILY
Qty: 90 TABLET | Refills: 3 | Status: SHIPPED | OUTPATIENT
Start: 2024-09-18

## 2024-10-08 PROBLEM — I48.3 TYPICAL ATRIAL FLUTTER (MULTI): Chronic | Status: ACTIVE | Noted: 2023-10-20

## 2024-10-09 ENCOUNTER — APPOINTMENT (OUTPATIENT)
Dept: CARDIOLOGY | Facility: CLINIC | Age: 64
End: 2024-10-09
Payer: COMMERCIAL

## 2024-11-18 DIAGNOSIS — I10 PRIMARY HYPERTENSION: ICD-10-CM

## 2024-11-18 RX ORDER — VALSARTAN 80 MG/1
80 TABLET ORAL DAILY
Qty: 90 TABLET | Refills: 3 | Status: SHIPPED | OUTPATIENT
Start: 2024-11-18

## 2025-02-13 DIAGNOSIS — M51.26 HERNIATED LUMBAR INTERVERTEBRAL DISC: ICD-10-CM

## 2025-02-13 RX ORDER — CYCLOBENZAPRINE HCL 10 MG
TABLET ORAL
Qty: 90 TABLET | Refills: 1 | Status: SHIPPED | OUTPATIENT
Start: 2025-02-13

## 2025-04-01 ENCOUNTER — APPOINTMENT (OUTPATIENT)
Dept: PRIMARY CARE | Facility: CLINIC | Age: 65
End: 2025-04-01
Payer: COMMERCIAL

## 2025-05-04 NOTE — PROGRESS NOTES
"    History Of Present Illness:      This is a 64-year-old male with a history of atrial flutter.  He presents today for a follow-up evaluation.  The patient had a YESI guided cardioversion for atrial flutter in June 2019 and had a recurrence of atrial flutter in 2023.  He was cardioverted November 21, 2023 since last office visit, he has noted an increase in shortness of breath on exertion, particularly when he is working.  He denies having any chest tightness or chest pain.  No palpitations.  He does have Xarelto at home to resume if he goes back into atrial fibrillation or atrial flutter.    Review of Systems  Other review of systems negative  Last Recorded Vitals:      3/29/2023     4:36 PM 7/10/2023     5:19 PM 10/20/2023     8:12 AM 11/21/2023     7:11 AM 12/27/2023    10:32 AM 1/8/2024     5:01 PM 7/8/2024     4:05 PM   Vitals   Systolic 130 122 104 118 140 105 121   Diastolic 88 88 60 87 80 69 81   BP Location   Right arm  Right arm  Right arm   Heart Rate 106 111 91 137 65 86 78   Temp 35.9 °C (96.6 °F) 36.1 °C (96.9 °F)    36.7 °C (98 °F)    Resp    16      Height 1.778 m (5' 10\") 1.778 m (5' 10\") 1.778 m (5' 10\") 1.778 m (5' 10\") 1.778 m (5' 10\") 1.778 m (5' 10\") 1.778 m (5' 10\")   Weight (lb) 203.38 207.38 206 204.59 207 212.8 209   BMI 29.18 kg/m2 29.76 kg/m2 29.56 kg/m2 29.36 kg/m2 29.7 kg/m2 30.53 kg/m2 29.99 kg/m2   BSA (m2) 2.14 m2 2.16 m2 2.15 m2 2.14 m2 2.15 m2 2.18 m2 2.16 m2   Visit Report   Report  Report Report Report     Allergies:  Patient has no known allergies.  Outpatient Medications:  Current Outpatient Medications   Medication Instructions    albuterol 90 mcg/actuation inhaler INHALE 1 TO 2 PUFFS EVERY 4 TO 6 HOURS AS NEEDED.    aspirin 81 mg, oral, Daily    atorvastatin (LIPITOR) 20 mg, oral, Daily    budesonide-formoteroL (Symbicort) 160-4.5 mcg/actuation inhaler 2 puffs, inhalation, 2 times daily RT, Rinse mouth with water after use to reduce aftertaste and incidence of candidiasis. " Do not swallow.    cyclobenzaprine (Flexeril) 10 mg tablet TAKE 1 TABLET THREE TIMES A DAY AS NEEDED FOR MUSCLE SPASMS    ibuprofen (AdviL) 200 mg tablet 1 tablet, oral, 3 times daily PRN    metoprolol tartrate (LOPRESSOR) 100 mg, oral, 2 times daily    valsartan (DIOVAN) 80 mg, oral, Daily       Physical Exam:    General Appearance:  Alert, oriented, no distress  Skin:  Warm and dry  Head and Neck:  No elevation of JVP, no carotid bruits  Cardiac Exam:  Rhythm is regular, S1 and S2 are normal, no murmur S3 or S4  Lungs:  Clear to auscultation  Extremities:  no edema  Neurologic:  No focal deficits  Psychiatric:  Appropriate mood and behavior    Lab Results:    CMP:  Recent Labs     06/21/24  1402 07/08/23  0737 11/01/22  0913 02/24/22  1557 12/30/20  1126 09/23/19  0825 06/18/19  0615 06/16/19 2018    139 142 136 140 140 139 138   K 4.5 4.4 5.3 5.0 4.5 5.0 4.5 4.0    106 106 101 103 103 105 102   CO2 28 25 33* 30 29 33* 28 30   ANIONGAP 12 12 8* 10 13 9* 11 10   BUN 17 21 23 22 19 13 16 19   CREATININE 0.90 0.96 1.01 0.94 0.81 0.90 0.97 1.04   EGFR >90  --   --   --   --   --   --   --    MG  --   --   --   --   --   --  2.12  --      Recent Labs     06/21/24  1402 07/08/23  0737 11/01/22  0913 02/24/22  1557 12/30/20  1126   ALBUMIN 4.5 4.4 4.4 4.3 4.4   ALKPHOS 65 68 66 65 73   ALT 17 19 20 19 28   AST 17 17 16 15 17   BILITOT 1.2 1.1 1.4* 1.1 1.4*     CBC:  Recent Labs     06/21/24  1402 07/08/23  0737 11/01/22  0913 04/26/22  0720 02/24/22  1557 12/30/20  1126 09/23/19  0825 06/18/19  0615   WBC 8.7 8.4 7.8 9.1 9.2 8.5 7.1 9.5   HGB 16.0 16.0 15.8 16.4 16.0 15.8 15.4 16.0   HCT 46.5 48.8 48.7 48.7 48.1 48.4 47.4 47.9    243 235 242 265 295 250 259   MCV 89 89 92 90 91 93 93 88     COAG:   Recent Labs     04/26/22  0720 06/18/19  0615 06/17/19  0430 06/17/19  0125 06/16/19 2018   INR 1.0 1.2*  --   --  1.1   HAUF  --   --  0.5 0.4  --      Cardiology Tests (personally reviewed):    EKG  December 27, 2023: Sinus rhythm  Transesophageal echocardiogram June 2019: Ejection fraction 50%  EKG May 5, 2025: Normal sinus rhythm, no ischemic changes  Assessment/Plan   Problem List Items Addressed This Visit           ICD-10-CM    Paroxysmal atrial fibrillation (Multi) I48.0    Clyde has a history of typical atrial flutter and had a YESI guided cardioversion in June 2019.  He had a recurrence of atrial flutter and then had a successful cardioversion November 21, 2023.  He has been off Xarelto since he is maintaining sinus rhythm.  Continue metoprolol at the same dosage.  He does not require catheter ablation at this time.         Relevant Orders    ECG 12 lead (Clinic Performed) (Completed)    Transthoracic echo (TTE) complete    Hypertension - Primary I10    Continue valsartan at the same dosage.         Shortness of breath R06.02    An echocardiogram will be obtained to reassess his left ventricular function in view of his symptom of shortness of breath.  The last echocardiogram was in 2019.            James C Ramicone, DO

## 2025-05-05 ENCOUNTER — OFFICE VISIT (OUTPATIENT)
Dept: CARDIOLOGY | Facility: CLINIC | Age: 65
End: 2025-05-05
Payer: COMMERCIAL

## 2025-05-05 VITALS
DIASTOLIC BLOOD PRESSURE: 80 MMHG | SYSTOLIC BLOOD PRESSURE: 130 MMHG | BODY MASS INDEX: 29.78 KG/M2 | OXYGEN SATURATION: 98 % | HEART RATE: 55 BPM | HEIGHT: 70 IN | WEIGHT: 208 LBS

## 2025-05-05 DIAGNOSIS — R06.02 SHORTNESS OF BREATH: ICD-10-CM

## 2025-05-05 DIAGNOSIS — I48.0 PAROXYSMAL ATRIAL FIBRILLATION (MULTI): ICD-10-CM

## 2025-05-05 DIAGNOSIS — I10 PRIMARY HYPERTENSION: Primary | ICD-10-CM

## 2025-05-05 PROCEDURE — 93010 ELECTROCARDIOGRAM REPORT: CPT | Performed by: INTERNAL MEDICINE

## 2025-05-05 PROCEDURE — 3079F DIAST BP 80-89 MM HG: CPT | Performed by: INTERNAL MEDICINE

## 2025-05-05 PROCEDURE — 99212 OFFICE O/P EST SF 10 MIN: CPT | Performed by: INTERNAL MEDICINE

## 2025-05-05 PROCEDURE — 3075F SYST BP GE 130 - 139MM HG: CPT | Performed by: INTERNAL MEDICINE

## 2025-05-05 PROCEDURE — 1036F TOBACCO NON-USER: CPT | Performed by: INTERNAL MEDICINE

## 2025-05-05 PROCEDURE — 3008F BODY MASS INDEX DOCD: CPT | Performed by: INTERNAL MEDICINE

## 2025-05-05 PROCEDURE — 99214 OFFICE O/P EST MOD 30 MIN: CPT | Performed by: INTERNAL MEDICINE

## 2025-05-05 PROCEDURE — 93005 ELECTROCARDIOGRAM TRACING: CPT | Performed by: INTERNAL MEDICINE

## 2025-05-05 NOTE — ASSESSMENT & PLAN NOTE
An echocardiogram will be obtained to reassess his left ventricular function in view of his symptom of shortness of breath.  The last echocardiogram was in 2019.

## 2025-05-05 NOTE — ASSESSMENT & PLAN NOTE
Clyde has a history of typical atrial flutter and had a YESI guided cardioversion in June 2019.  He had a recurrence of atrial flutter and then had a successful cardioversion November 21, 2023.  He has been off Xarelto since he is maintaining sinus rhythm.  Continue metoprolol at the same dosage.  He does not require catheter ablation at this time.

## 2025-05-06 LAB
ATRIAL RATE: 66 BPM
P AXIS: 78 DEGREES
P OFFSET: 185 MS
P ONSET: 134 MS
PR INTERVAL: 154 MS
Q ONSET: 211 MS
QRS COUNT: 11 BEATS
QRS DURATION: 84 MS
QT INTERVAL: 384 MS
QTC CALCULATION(BAZETT): 402 MS
QTC FREDERICIA: 396 MS
R AXIS: 97 DEGREES
T AXIS: 59 DEGREES
T OFFSET: 403 MS
VENTRICULAR RATE: 66 BPM

## 2025-05-21 ENCOUNTER — HOSPITAL ENCOUNTER (OUTPATIENT)
Dept: CARDIOLOGY | Facility: CLINIC | Age: 65
Discharge: HOME | End: 2025-05-21
Payer: COMMERCIAL

## 2025-05-21 VITALS
DIASTOLIC BLOOD PRESSURE: 80 MMHG | BODY MASS INDEX: 29.78 KG/M2 | SYSTOLIC BLOOD PRESSURE: 130 MMHG | HEIGHT: 70 IN | WEIGHT: 208 LBS

## 2025-05-21 DIAGNOSIS — I48.0 PAROXYSMAL ATRIAL FIBRILLATION (MULTI): ICD-10-CM

## 2025-05-21 DIAGNOSIS — R06.02 SHORTNESS OF BREATH: ICD-10-CM

## 2025-05-21 PROCEDURE — 93306 TTE W/DOPPLER COMPLETE: CPT | Performed by: STUDENT IN AN ORGANIZED HEALTH CARE EDUCATION/TRAINING PROGRAM

## 2025-05-21 PROCEDURE — 93306 TTE W/DOPPLER COMPLETE: CPT

## 2025-05-22 LAB
AORTIC VALVE MEAN GRADIENT: 4 MMHG
AORTIC VALVE PEAK VELOCITY: 1.27 M/S
AV PEAK GRADIENT: 6 MMHG
AVA (PEAK VEL): 1.93 CM2
AVA (VTI): 1.97 CM2
EJECTION FRACTION APICAL 4 CHAMBER: 50.7
EJECTION FRACTION: 55 %
LEFT ATRIUM VOLUME AREA LENGTH INDEX BSA: 23.9 ML/M2
LEFT VENTRICLE INTERNAL DIMENSION DIASTOLE: 4.11 CM (ref 3.5–6)
LEFT VENTRICULAR OUTFLOW TRACT DIAMETER: 1.94 CM
MITRAL VALVE E/A RATIO: 1.6
RIGHT VENTRICLE FREE WALL PEAK S': 1.2 CM/S
RIGHT VENTRICLE PEAK SYSTOLIC PRESSURE: 35.4 MMHG
TRICUSPID ANNULAR PLANE SYSTOLIC EXCURSION: 2.4 CM

## 2025-07-07 ENCOUNTER — APPOINTMENT (OUTPATIENT)
Dept: PRIMARY CARE | Facility: CLINIC | Age: 65
End: 2025-07-07
Payer: COMMERCIAL

## 2025-07-07 VITALS
OXYGEN SATURATION: 93 % | DIASTOLIC BLOOD PRESSURE: 78 MMHG | SYSTOLIC BLOOD PRESSURE: 116 MMHG | WEIGHT: 204 LBS | HEART RATE: 66 BPM | HEIGHT: 70 IN | BODY MASS INDEX: 29.2 KG/M2

## 2025-07-07 DIAGNOSIS — I10 PRIMARY HYPERTENSION: ICD-10-CM

## 2025-07-07 DIAGNOSIS — Z00.00 ANNUAL PHYSICAL EXAM: ICD-10-CM

## 2025-07-07 DIAGNOSIS — R73.9 HYPERGLYCEMIA: ICD-10-CM

## 2025-07-07 DIAGNOSIS — Z12.11 SCREENING FOR COLON CANCER: ICD-10-CM

## 2025-07-07 DIAGNOSIS — Z12.5 SCREENING FOR PROSTATE CANCER: ICD-10-CM

## 2025-07-07 DIAGNOSIS — Z13.6 SCREENING FOR CARDIOVASCULAR CONDITION: Primary | ICD-10-CM

## 2025-07-07 DIAGNOSIS — G45.9 TIA (TRANSIENT ISCHEMIC ATTACK): ICD-10-CM

## 2025-07-07 DIAGNOSIS — J45.20 MILD INTERMITTENT ASTHMA WITHOUT COMPLICATION (HHS-HCC): ICD-10-CM

## 2025-07-07 PROBLEM — R06.02 SHORTNESS OF BREATH: Status: RESOLVED | Noted: 2023-10-19 | Resolved: 2025-07-07

## 2025-07-07 PROBLEM — K64.4 EXTERNAL HEMORRHOIDS: Status: RESOLVED | Noted: 2023-10-19 | Resolved: 2025-07-07

## 2025-07-07 PROBLEM — J45.909 ASTHMA: Status: RESOLVED | Noted: 2023-10-19 | Resolved: 2025-07-07

## 2025-07-07 PROCEDURE — 99396 PREV VISIT EST AGE 40-64: CPT | Performed by: INTERNAL MEDICINE

## 2025-07-07 PROCEDURE — 3078F DIAST BP <80 MM HG: CPT | Performed by: INTERNAL MEDICINE

## 2025-07-07 PROCEDURE — 3008F BODY MASS INDEX DOCD: CPT | Performed by: INTERNAL MEDICINE

## 2025-07-07 PROCEDURE — 3074F SYST BP LT 130 MM HG: CPT | Performed by: INTERNAL MEDICINE

## 2025-07-07 PROCEDURE — 1036F TOBACCO NON-USER: CPT | Performed by: INTERNAL MEDICINE

## 2025-07-07 RX ORDER — BUDESONIDE AND FORMOTEROL FUMARATE DIHYDRATE 160; 4.5 UG/1; UG/1
2 AEROSOL RESPIRATORY (INHALATION)
Qty: 30.6 G | Refills: 3 | Status: SHIPPED | OUTPATIENT
Start: 2025-07-07 | End: 2026-07-07

## 2025-07-07 RX ORDER — METOPROLOL TARTRATE 100 MG/1
100 TABLET ORAL 2 TIMES DAILY
Qty: 180 TABLET | Refills: 3 | Status: SHIPPED | OUTPATIENT
Start: 2025-07-07 | End: 2026-07-07

## 2025-07-07 RX ORDER — ATORVASTATIN CALCIUM 20 MG/1
20 TABLET, FILM COATED ORAL DAILY
Qty: 90 TABLET | Refills: 3 | Status: SHIPPED | OUTPATIENT
Start: 2025-07-07

## 2025-07-07 RX ORDER — VALSARTAN 80 MG/1
80 TABLET ORAL DAILY
Qty: 90 TABLET | Refills: 3 | Status: SHIPPED | OUTPATIENT
Start: 2025-07-07

## 2025-07-07 SDOH — ECONOMIC STABILITY: FOOD INSECURITY: WITHIN THE PAST 12 MONTHS, THE FOOD YOU BOUGHT JUST DIDN'T LAST AND YOU DIDN'T HAVE MONEY TO GET MORE.: NEVER TRUE

## 2025-07-07 SDOH — ECONOMIC STABILITY: FOOD INSECURITY: WITHIN THE PAST 12 MONTHS, YOU WORRIED THAT YOUR FOOD WOULD RUN OUT BEFORE YOU GOT MONEY TO BUY MORE.: NEVER TRUE

## 2025-07-07 ASSESSMENT — PATIENT HEALTH QUESTIONNAIRE - PHQ9
1. LITTLE INTEREST OR PLEASURE IN DOING THINGS: NOT AT ALL
2. FEELING DOWN, DEPRESSED OR HOPELESS: NOT AT ALL
SUM OF ALL RESPONSES TO PHQ9 QUESTIONS 1 & 2: 0

## 2025-07-07 ASSESSMENT — PAIN SCALES - GENERAL: PAINLEVEL_OUTOF10: 0-NO PAIN

## 2025-07-07 NOTE — PROGRESS NOTES
"Subjective   Patient ID: Clyde Pacheco is a 64 y.o. male who presents for Annual Exam.    HPI   Physically active at work and around the house, no other formal exercise.  Denies CP with exertion.  Diet overall healthy.    Not checking BP often at home  No HA, dizziness, CP.    Tolerating statin.    Back pain at baseline.    Using symbicort once daily. Albuterol a few times per day on some days, none on others.      Review of Systems    Objective   /78 (BP Location: Right arm, Patient Position: Sitting)   Pulse 66   Ht 1.778 m (5' 10\")   Wt 92.5 kg (204 lb)   SpO2 93%   BMI 29.27 kg/m²     Physical Exam  Constitutional:       Appearance: Normal appearance.   HENT:      Right Ear: Tympanic membrane and ear canal normal.      Left Ear: Tympanic membrane and ear canal normal.      Mouth/Throat:      Mouth: Mucous membranes are moist.   Eyes:      Extraocular Movements: Extraocular movements intact.      Pupils: Pupils are equal, round, and reactive to light.   Cardiovascular:      Rate and Rhythm: Normal rate and regular rhythm.      Heart sounds: No murmur heard.  Pulmonary:      Effort: Pulmonary effort is normal.      Breath sounds: Normal breath sounds.   Abdominal:      General: Bowel sounds are normal.      Palpations: Abdomen is soft.      Tenderness: There is no abdominal tenderness.   Musculoskeletal:         General: No deformity.      Cervical back: Neck supple.      Right lower leg: No edema.      Left lower leg: No edema.   Skin:     Findings: No lesion or rash.   Neurological:      Mental Status: He is alert.      Cranial Nerves: No cranial nerve deficit.      Motor: No weakness.      Gait: Gait normal.         Assessment/Plan   Problem List Items Addressed This Visit           ICD-10-CM    TIA (transient ischemic attack) G45.9    Relevant Medications    valsartan (Diovan) 80 mg tablet    metoprolol tartrate (Lopressor) 100 mg tablet    budesonide-formoterol (Symbicort) 160-4.5 mcg/actuation " inhaler    atorvastatin (Lipitor) 20 mg tablet    Hypertension I10    Relevant Medications    valsartan (Diovan) 80 mg tablet    metoprolol tartrate (Lopressor) 100 mg tablet    Other Relevant Orders    CBC    Comprehensive Metabolic Panel    Hyperglycemia R73.9    Relevant Orders    Hemoglobin A1C    RESOLVED: Asthma J45.909    Relevant Medications    budesonide-formoterol (Symbicort) 160-4.5 mcg/actuation inhaler    Annual physical exam Z00.00     Other Visit Diagnoses         Codes      Screening for cardiovascular condition    -  Primary Z13.6    Relevant Orders    Lipid Panel      Screening for prostate cancer     Z12.5    Relevant Orders    Prostate Specific Antigen, Screen      Screening for colon cancer     Z12.11    Relevant Orders    Cologuard® colon cancer screening             Afib - S/p cardioversion, remains in NSR  -Continue metoprolol  -Continue aspirin  -Seeing Cardiology     Lumbar radiculopathy  -Stable, monitor     HTN  -Continue valsartan     HLP  -Continue statin     Asthma - continue symbicort, advised to use BID. Albuterol prn.      Health maintenance:  -Colonoscopy - Cologuard neg 12/2022, ordered for 12/2025  -Prostate - PSA nml 6/2024  -Immunizations    -Shingrix - had 1st     f/u in 6 months

## 2025-07-08 ENCOUNTER — APPOINTMENT (OUTPATIENT)
Dept: PRIMARY CARE | Facility: CLINIC | Age: 65
End: 2025-07-08
Payer: COMMERCIAL

## 2025-07-09 DIAGNOSIS — M51.26 HERNIATED LUMBAR INTERVERTEBRAL DISC: ICD-10-CM

## 2025-07-10 RX ORDER — CYCLOBENZAPRINE HCL 10 MG
10 TABLET ORAL 3 TIMES DAILY PRN
Qty: 90 TABLET | Refills: 1 | Status: SHIPPED | OUTPATIENT
Start: 2025-07-10

## 2025-07-25 LAB — NONINV COLON CA DNA+OCC BLD SCRN STL QL: NEGATIVE

## 2025-08-23 DIAGNOSIS — R06.00 DYSPNEA, UNSPECIFIED TYPE: ICD-10-CM

## 2025-08-25 RX ORDER — ALBUTEROL SULFATE 90 UG/1
1-2 INHALANT RESPIRATORY (INHALATION)
Qty: 25.5 G | Refills: 3 | Status: SHIPPED | OUTPATIENT
Start: 2025-08-25

## 2025-08-26 ENCOUNTER — PATIENT MESSAGE (OUTPATIENT)
Dept: PRIMARY CARE | Facility: CLINIC | Age: 65
End: 2025-08-26
Payer: COMMERCIAL

## 2025-09-04 DIAGNOSIS — M51.26 HERNIATED LUMBAR INTERVERTEBRAL DISC: ICD-10-CM

## 2025-09-04 RX ORDER — CYCLOBENZAPRINE HCL 10 MG
10 TABLET ORAL 3 TIMES DAILY PRN
Qty: 90 TABLET | Refills: 1 | Status: SHIPPED | OUTPATIENT
Start: 2025-09-04

## 2025-12-22 ENCOUNTER — APPOINTMENT (OUTPATIENT)
Dept: PRIMARY CARE | Facility: CLINIC | Age: 65
End: 2025-12-22
Payer: COMMERCIAL